# Patient Record
Sex: MALE
[De-identification: names, ages, dates, MRNs, and addresses within clinical notes are randomized per-mention and may not be internally consistent; named-entity substitution may affect disease eponyms.]

---

## 2019-05-24 ENCOUNTER — NON-APPOINTMENT (OUTPATIENT)
Age: 54
End: 2019-05-24

## 2019-05-24 ENCOUNTER — APPOINTMENT (OUTPATIENT)
Dept: INTERNAL MEDICINE | Facility: CLINIC | Age: 54
End: 2019-05-24
Payer: COMMERCIAL

## 2019-05-24 VITALS
HEIGHT: 67.5 IN | TEMPERATURE: 97.6 F | WEIGHT: 147 LBS | SYSTOLIC BLOOD PRESSURE: 122 MMHG | RESPIRATION RATE: 14 BRPM | HEART RATE: 63 BPM | DIASTOLIC BLOOD PRESSURE: 80 MMHG | OXYGEN SATURATION: 99 % | BODY MASS INDEX: 22.8 KG/M2

## 2019-05-24 DIAGNOSIS — Z87.19 PERSONAL HISTORY OF OTHER DISEASES OF THE DIGESTIVE SYSTEM: ICD-10-CM

## 2019-05-24 DIAGNOSIS — Z83.3 FAMILY HISTORY OF DIABETES MELLITUS: ICD-10-CM

## 2019-05-24 DIAGNOSIS — Z87.891 PERSONAL HISTORY OF NICOTINE DEPENDENCE: ICD-10-CM

## 2019-05-24 DIAGNOSIS — Z86.59 PERSONAL HISTORY OF OTHER MENTAL AND BEHAVIORAL DISORDERS: ICD-10-CM

## 2019-05-24 PROCEDURE — 36415 COLL VENOUS BLD VENIPUNCTURE: CPT

## 2019-05-24 PROCEDURE — 99386 PREV VISIT NEW AGE 40-64: CPT | Mod: 25

## 2019-05-24 PROCEDURE — 93000 ELECTROCARDIOGRAM COMPLETE: CPT

## 2019-05-24 RX ORDER — FEXOFENADINE HCL 60 MG
TABLET ORAL
Refills: 0 | Status: ACTIVE | COMMUNITY

## 2019-05-24 NOTE — HISTORY OF PRESENT ILLNESS
[FreeTextEntry1] : PHYSICAL\par NO COMPLAINTS [de-identified] : 53 YO MALE WITH HX OF GASTRITIS AND ANXIETY THAT RESOLVED, CAME TO THE OFFICE FOR PHYSICAL

## 2019-05-24 NOTE — HEALTH RISK ASSESSMENT
[Very Good] : ~his/her~  mood as very good [No falls in past year] : Patient reported no falls in the past year [0] : 2) Feeling down, depressed, or hopeless: Not at all (0) [] : No [de-identified] : REGULAR

## 2019-05-24 NOTE — PHYSICAL EXAM
[No Acute Distress] : no acute distress [Well Developed] : well developed [Well Nourished] : well nourished [Normal Sclera/Conjunctiva] : normal sclera/conjunctiva [Well-Appearing] : well-appearing [PERRL] : pupils equal round and reactive to light [Normal Outer Ear/Nose] : the outer ears and nose were normal in appearance [EOMI] : extraocular movements intact [Normal Oropharynx] : the oropharynx was normal [No JVD] : no jugular venous distention [Supple] : supple [No Lymphadenopathy] : no lymphadenopathy [Thyroid Normal, No Nodules] : the thyroid was normal and there were no nodules present [No Respiratory Distress] : no respiratory distress  [No Accessory Muscle Use] : no accessory muscle use [Clear to Auscultation] : lungs were clear to auscultation bilaterally [Normal Rate] : normal rate  [Normal S1, S2] : normal S1 and S2 [Regular Rhythm] : with a regular rhythm [No Carotid Bruits] : no carotid bruits [No Murmur] : no murmur heard [No Varicosities] : no varicosities [No Abdominal Bruit] : a ~M bruit was not heard ~T in the abdomen [Pedal Pulses Present] : the pedal pulses are present [No Edema] : there was no peripheral edema [No Extremity Clubbing/Cyanosis] : no extremity clubbing/cyanosis [Soft] : abdomen soft [No Palpable Aorta] : no palpable aorta [Non Tender] : non-tender [Non-distended] : non-distended [No Masses] : no abdominal mass palpated [Normal Bowel Sounds] : normal bowel sounds [No HSM] : no HSM [Normal Posterior Cervical Nodes] : no posterior cervical lymphadenopathy [Normal Anterior Cervical Nodes] : no anterior cervical lymphadenopathy [No CVA Tenderness] : no CVA  tenderness [No Joint Swelling] : no joint swelling [No Spinal Tenderness] : no spinal tenderness [Grossly Normal Strength/Tone] : grossly normal strength/tone [Normal Gait] : normal gait [Coordination Grossly Intact] : coordination grossly intact [No Rash] : no rash [No Focal Deficits] : no focal deficits [Deep Tendon Reflexes (DTR)] : deep tendon reflexes were 2+ and symmetric [Normal Affect] : the affect was normal [Normal Insight/Judgement] : insight and judgment were intact [FreeTextEntry1] : DEFERRED

## 2019-05-27 LAB
ALBUMIN SERPL ELPH-MCNC: 4.7 G/DL
ALP BLD-CCNC: 70 U/L
ALT SERPL-CCNC: 21 U/L
ANION GAP SERPL CALC-SCNC: 11 MMOL/L
APPEARANCE: CLEAR
AST SERPL-CCNC: 18 U/L
BACTERIA: NEGATIVE
BASOPHILS # BLD AUTO: 0.04 K/UL
BASOPHILS NFR BLD AUTO: 0.7 %
BILIRUB SERPL-MCNC: 0.8 MG/DL
BILIRUBIN URINE: NEGATIVE
BLOOD URINE: NEGATIVE
BUN SERPL-MCNC: 12 MG/DL
CALCIUM SERPL-MCNC: 9.7 MG/DL
CHLORIDE SERPL-SCNC: 102 MMOL/L
CHOLEST SERPL-MCNC: 240 MG/DL
CHOLEST/HDLC SERPL: 4.3 RATIO
CO2 SERPL-SCNC: 28 MMOL/L
COLOR: COLORLESS
CREAT SERPL-MCNC: 0.97 MG/DL
EOSINOPHIL # BLD AUTO: 0.14 K/UL
EOSINOPHIL NFR BLD AUTO: 2.3 %
GLUCOSE QUALITATIVE U: NEGATIVE
GLUCOSE SERPL-MCNC: 100 MG/DL
HCT VFR BLD CALC: 48.2 %
HDLC SERPL-MCNC: 56 MG/DL
HGB BLD-MCNC: 14.5 G/DL
HYALINE CASTS: 0 /LPF
IMM GRANULOCYTES NFR BLD AUTO: 0.2 %
KETONES URINE: NEGATIVE
LDLC SERPL CALC-MCNC: 145 MG/DL
LEUKOCYTE ESTERASE URINE: NEGATIVE
LYMPHOCYTES # BLD AUTO: 2.34 K/UL
LYMPHOCYTES NFR BLD AUTO: 38 %
MAN DIFF?: NORMAL
MCHC RBC-ENTMCNC: 27.3 PG
MCHC RBC-ENTMCNC: 30.1 GM/DL
MCV RBC AUTO: 90.6 FL
MICROSCOPIC-UA: NORMAL
MONOCYTES # BLD AUTO: 0.53 K/UL
MONOCYTES NFR BLD AUTO: 8.6 %
NEUTROPHILS # BLD AUTO: 3.09 K/UL
NEUTROPHILS NFR BLD AUTO: 50.2 %
NITRITE URINE: NEGATIVE
PH URINE: 6.5
PLATELET # BLD AUTO: 247 K/UL
POTASSIUM SERPL-SCNC: 4.9 MMOL/L
PROT SERPL-MCNC: 7.3 G/DL
PROTEIN URINE: NEGATIVE
PSA SERPL-MCNC: 2.32 NG/ML
RBC # BLD: 5.32 M/UL
RBC # FLD: 13.1 %
RED BLOOD CELLS URINE: 0 /HPF
SODIUM SERPL-SCNC: 141 MMOL/L
SPECIFIC GRAVITY URINE: 1
SQUAMOUS EPITHELIAL CELLS: 0 /HPF
TRIGL SERPL-MCNC: 193 MG/DL
UROBILINOGEN URINE: NORMAL
WBC # FLD AUTO: 6.15 K/UL
WHITE BLOOD CELLS URINE: 0 /HPF

## 2019-05-28 ENCOUNTER — OTHER (OUTPATIENT)
Age: 54
End: 2019-05-28

## 2019-06-25 RX ORDER — ATORVASTATIN CALCIUM 10 MG/1
10 TABLET, FILM COATED ORAL
Qty: 90 | Refills: 0 | Status: DISCONTINUED | COMMUNITY
Start: 2019-05-28 | End: 2019-06-25

## 2019-10-14 ENCOUNTER — APPOINTMENT (OUTPATIENT)
Dept: INTERNAL MEDICINE | Facility: CLINIC | Age: 54
End: 2019-10-14

## 2020-06-08 ENCOUNTER — APPOINTMENT (OUTPATIENT)
Dept: INTERNAL MEDICINE | Facility: CLINIC | Age: 55
End: 2020-06-08
Payer: COMMERCIAL

## 2020-06-08 ENCOUNTER — NON-APPOINTMENT (OUTPATIENT)
Age: 55
End: 2020-06-08

## 2020-06-08 VITALS
SYSTOLIC BLOOD PRESSURE: 137 MMHG | HEART RATE: 66 BPM | DIASTOLIC BLOOD PRESSURE: 80 MMHG | WEIGHT: 149 LBS | RESPIRATION RATE: 15 BRPM | TEMPERATURE: 98.8 F | OXYGEN SATURATION: 99 % | HEIGHT: 67.5 IN | BODY MASS INDEX: 23.11 KG/M2

## 2020-06-08 PROCEDURE — 93000 ELECTROCARDIOGRAM COMPLETE: CPT

## 2020-06-08 PROCEDURE — 36415 COLL VENOUS BLD VENIPUNCTURE: CPT

## 2020-06-08 PROCEDURE — 99214 OFFICE O/P EST MOD 30 MIN: CPT | Mod: 25

## 2020-06-08 NOTE — PLAN
[FreeTextEntry1] : Blood tests sent to the lab \par \par EKG\par \par Screening for COVID-19 antibodies

## 2020-06-08 NOTE — HEALTH RISK ASSESSMENT
[Good] : ~his/her~  mood as  good [Never (0 pts)] : Never (0 points) [No] : In the past 12 months have you used drugs other than those required for medical reasons? No [No falls in past year] : Patient reported no falls in the past year [0] : 2) Feeling down, depressed, or hopeless: Not at all (0) [] : No [Audit-CScore] : 0 [de-identified] : lightly active [de-identified] : regular diet [[id number]] : 0

## 2020-06-08 NOTE — PHYSICAL EXAM
[No Acute Distress] : no acute distress [Well Nourished] : well nourished [Well Developed] : well developed [Well-Appearing] : well-appearing [Normal Sclera/Conjunctiva] : normal sclera/conjunctiva [PERRL] : pupils equal round and reactive to light [EOMI] : extraocular movements intact [Normal Outer Ear/Nose] : the outer ears and nose were normal in appearance [Normal Oropharynx] : the oropharynx was normal [No JVD] : no jugular venous distention [No Lymphadenopathy] : no lymphadenopathy [Supple] : supple [Thyroid Normal, No Nodules] : the thyroid was normal and there were no nodules present [No Respiratory Distress] : no respiratory distress  [No Accessory Muscle Use] : no accessory muscle use [Clear to Auscultation] : lungs were clear to auscultation bilaterally [Normal Rate] : normal rate  [Regular Rhythm] : with a regular rhythm [Normal S1, S2] : normal S1 and S2 [No Murmur] : no murmur heard [No Carotid Bruits] : no carotid bruits [No Abdominal Bruit] : a ~M bruit was not heard ~T in the abdomen [No Varicosities] : no varicosities [Pedal Pulses Present] : the pedal pulses are present [No Edema] : there was no peripheral edema [No Palpable Aorta] : no palpable aorta [No Extremity Clubbing/Cyanosis] : no extremity clubbing/cyanosis [Soft] : abdomen soft [Non Tender] : non-tender [Non-distended] : non-distended [No Masses] : no abdominal mass palpated [No HSM] : no HSM [Normal Bowel Sounds] : normal bowel sounds [Normal Posterior Cervical Nodes] : no posterior cervical lymphadenopathy [Normal Anterior Cervical Nodes] : no anterior cervical lymphadenopathy [No CVA Tenderness] : no CVA  tenderness [No Spinal Tenderness] : no spinal tenderness [No Joint Swelling] : no joint swelling [Grossly Normal Strength/Tone] : grossly normal strength/tone [No Rash] : no rash [No Focal Deficits] : no focal deficits [Coordination Grossly Intact] : coordination grossly intact [Normal Gait] : normal gait [Deep Tendon Reflexes (DTR)] : deep tendon reflexes were 2+ and symmetric [Normal Affect] : the affect was normal [Normal Insight/Judgement] : insight and judgment were intact [Normal Sphincter Tone] : normal sphincter tone [No Mass] : no mass [Stool Occult Blood] : stool negative for occult blood [FreeTextEntry1] : mildly enlarged prostate

## 2020-06-08 NOTE — HISTORY OF PRESENT ILLNESS
[FreeTextEntry1] : Physical examination  [de-identified] : RAMA FRANCOIS is a 55 year old male patient with a PMHx of HLD and BPH who presents today for physical examination. He also requests COVID-19 antibody screening. He is feeling well today, but relates pain in both of his legs at times.

## 2020-06-08 NOTE — END OF VISIT
[FreeTextEntry3] : I, Ema Arshad, personally transcribed these services in the presence of Dr. Denver Vee MD. I, Dr. Denver Vee MD, personally performed the services described in this documentation as scribed by Ema Arshad in my presence and it is both accurate and complete.

## 2020-08-03 ENCOUNTER — RX RENEWAL (OUTPATIENT)
Age: 55
End: 2020-08-03

## 2021-06-17 ENCOUNTER — NON-APPOINTMENT (OUTPATIENT)
Age: 56
End: 2021-06-17

## 2021-06-17 ENCOUNTER — APPOINTMENT (OUTPATIENT)
Dept: INTERNAL MEDICINE | Facility: CLINIC | Age: 56
End: 2021-06-17
Payer: COMMERCIAL

## 2021-06-17 VITALS
HEART RATE: 66 BPM | BODY MASS INDEX: 23.58 KG/M2 | TEMPERATURE: 97.7 F | SYSTOLIC BLOOD PRESSURE: 135 MMHG | OXYGEN SATURATION: 99 % | WEIGHT: 152 LBS | HEIGHT: 67.5 IN | DIASTOLIC BLOOD PRESSURE: 81 MMHG | RESPIRATION RATE: 14 BRPM

## 2021-06-17 DIAGNOSIS — R73.09 OTHER ABNORMAL GLUCOSE: ICD-10-CM

## 2021-06-17 PROCEDURE — 99072 ADDL SUPL MATRL&STAF TM PHE: CPT

## 2021-06-17 PROCEDURE — 93000 ELECTROCARDIOGRAM COMPLETE: CPT

## 2021-06-17 PROCEDURE — 99396 PREV VISIT EST AGE 40-64: CPT | Mod: 25

## 2021-06-17 PROCEDURE — 36415 COLL VENOUS BLD VENIPUNCTURE: CPT

## 2021-06-20 NOTE — HISTORY OF PRESENT ILLNESS
[FreeTextEntry1] : Physical examination  [de-identified] : RAMA FRANCOIS is a 56 year old male with a PMHx of B12 deficiency, BPH, HLD, and anxiety who presents today for physical examination. He relates lower abdominal discomfort and the need to urinate throughout the night.\par \par He also relates lower back pain on and off.\par \par Pt has completed COVID-19 vaccine series ([employer]) on May 3, 2021.

## 2021-06-20 NOTE — END OF VISIT
[FreeTextEntry3] : I, Yue Baum, personally transcribed these services in the presence of Dr. Denver Vee MD. I, Dr. Denver Vee MD, personally performed the services described in this documentation as scribed by Yue Baum in my presence and it is both accurate and complete.

## 2021-06-20 NOTE — PHYSICAL EXAM
[No Acute Distress] : no acute distress [Well Nourished] : well nourished [Well Developed] : well developed [Well-Appearing] : well-appearing [Normal Sclera/Conjunctiva] : normal sclera/conjunctiva [PERRL] : pupils equal round and reactive to light [EOMI] : extraocular movements intact [Normal Outer Ear/Nose] : the outer ears and nose were normal in appearance [Normal Oropharynx] : the oropharynx was normal [No JVD] : no jugular venous distention [No Lymphadenopathy] : no lymphadenopathy [Supple] : supple [Thyroid Normal, No Nodules] : the thyroid was normal and there were no nodules present [No Respiratory Distress] : no respiratory distress  [No Accessory Muscle Use] : no accessory muscle use [Clear to Auscultation] : lungs were clear to auscultation bilaterally [Normal Rate] : normal rate  [Regular Rhythm] : with a regular rhythm [Normal S1, S2] : normal S1 and S2 [No Murmur] : no murmur heard [No Carotid Bruits] : no carotid bruits [No Abdominal Bruit] : a ~M bruit was not heard ~T in the abdomen [No Varicosities] : no varicosities [Pedal Pulses Present] : the pedal pulses are present [No Edema] : there was no peripheral edema [No Palpable Aorta] : no palpable aorta [No Extremity Clubbing/Cyanosis] : no extremity clubbing/cyanosis [Soft] : abdomen soft [Non Tender] : non-tender [Non-distended] : non-distended [No Masses] : no abdominal mass palpated [No HSM] : no HSM [Normal Bowel Sounds] : normal bowel sounds [Normal Sphincter Tone] : normal sphincter tone [No Mass] : no mass [Normal Posterior Cervical Nodes] : no posterior cervical lymphadenopathy [Normal Anterior Cervical Nodes] : no anterior cervical lymphadenopathy [No CVA Tenderness] : no CVA  tenderness [No Spinal Tenderness] : no spinal tenderness [No Joint Swelling] : no joint swelling [Grossly Normal Strength/Tone] : grossly normal strength/tone [No Rash] : no rash [Coordination Grossly Intact] : coordination grossly intact [No Focal Deficits] : no focal deficits [Normal Gait] : normal gait [Deep Tendon Reflexes (DTR)] : deep tendon reflexes were 2+ and symmetric [Normal Affect] : the affect was normal [Normal Insight/Judgement] : insight and judgment were intact [Stool Occult Blood] : stool negative for occult blood [FreeTextEntry1] : guaiac negative ; mildly enlarged prostate

## 2021-06-20 NOTE — PLAN
[FreeTextEntry1] : Blood tests and urine sent to the lab \par \par EKG\par \par Abdomen and Pelvic US\par

## 2021-06-20 NOTE — REVIEW OF SYSTEMS
[Negative] : Heme/Lymph [FreeTextEntry7] : pain bilateral groin [FreeTextEntry8] : frequent urination at night [FreeTextEntry9] : lower back pain

## 2021-06-20 NOTE — HEALTH RISK ASSESSMENT
[Good] : ~his/her~  mood as  good [Never (0 pts)] : Never (0 points) [No] : In the past 12 months have you used drugs other than those required for medical reasons? No [No falls in past year] : Patient reported no falls in the past year [0] : 2) Feeling down, depressed, or hopeless: Not at all (0) [] : No [Audit-CScore] : 0 [de-identified] : regular diet [de-identified] : lightly active [[id number]] : 0 [ColonoscopyDate] : 01/2020

## 2021-07-23 ENCOUNTER — APPOINTMENT (OUTPATIENT)
Dept: INTERNAL MEDICINE | Facility: CLINIC | Age: 56
End: 2021-07-23
Payer: COMMERCIAL

## 2021-07-23 VITALS
OXYGEN SATURATION: 99 % | TEMPERATURE: 99.3 F | RESPIRATION RATE: 15 BRPM | SYSTOLIC BLOOD PRESSURE: 129 MMHG | HEART RATE: 70 BPM | DIASTOLIC BLOOD PRESSURE: 81 MMHG | HEIGHT: 67.5 IN

## 2021-07-23 DIAGNOSIS — M54.16 RADICULOPATHY, LUMBAR REGION: ICD-10-CM

## 2021-07-23 PROCEDURE — 99072 ADDL SUPL MATRL&STAF TM PHE: CPT

## 2021-07-23 PROCEDURE — 99213 OFFICE O/P EST LOW 20 MIN: CPT

## 2021-07-25 NOTE — PHYSICAL EXAM
[No Acute Distress] : no acute distress [Well Nourished] : well nourished [Well Developed] : well developed [Well-Appearing] : well-appearing [Normal Sclera/Conjunctiva] : normal sclera/conjunctiva [PERRL] : pupils equal round and reactive to light [EOMI] : extraocular movements intact [Normal Outer Ear/Nose] : the outer ears and nose were normal in appearance [Normal Oropharynx] : the oropharynx was normal [No JVD] : no jugular venous distention [No Lymphadenopathy] : no lymphadenopathy [Supple] : supple [Thyroid Normal, No Nodules] : the thyroid was normal and there were no nodules present [No Respiratory Distress] : no respiratory distress  [No Accessory Muscle Use] : no accessory muscle use [Clear to Auscultation] : lungs were clear to auscultation bilaterally [Normal Rate] : normal rate  [Regular Rhythm] : with a regular rhythm [Normal S1, S2] : normal S1 and S2 [No Murmur] : no murmur heard [No Carotid Bruits] : no carotid bruits [No Abdominal Bruit] : a ~M bruit was not heard ~T in the abdomen [No Varicosities] : no varicosities [Pedal Pulses Present] : the pedal pulses are present [No Edema] : there was no peripheral edema [No Palpable Aorta] : no palpable aorta [No Extremity Clubbing/Cyanosis] : no extremity clubbing/cyanosis [Soft] : abdomen soft [Non Tender] : non-tender [Non-distended] : non-distended [No Masses] : no abdominal mass palpated [No HSM] : no HSM [Normal Bowel Sounds] : normal bowel sounds [Normal Posterior Cervical Nodes] : no posterior cervical lymphadenopathy [Normal Anterior Cervical Nodes] : no anterior cervical lymphadenopathy [No CVA Tenderness] : no CVA  tenderness [No Spinal Tenderness] : no spinal tenderness [No Joint Swelling] : no joint swelling [Grossly Normal Strength/Tone] : grossly normal strength/tone [No Rash] : no rash [Coordination Grossly Intact] : coordination grossly intact [No Focal Deficits] : no focal deficits [Normal Gait] : normal gait [Deep Tendon Reflexes (DTR)] : deep tendon reflexes were 2+ and symmetric [Normal Affect] : the affect was normal [Normal Insight/Judgement] : insight and judgment were intact [de-identified] : deferred

## 2021-07-25 NOTE — HISTORY OF PRESENT ILLNESS
[Spouse] : spouse [FreeTextEntry8] : RAMA FRANCOIS is a 56 year old male patient with a PMHx of B12 deficiency, HLD, anxiety, and BPH who presents today complaining of lower back pain, mostly on the left side, on and off. Four days ago, the back pain was severe. Patient took Tylenol and applied a heating pad, which helped.\par \par Patient had abdominal and pelvic US which was wnl; pancreatic tail was partially obscured by overlying abdominal bowel gas.

## 2021-07-25 NOTE — HEALTH RISK ASSESSMENT
[Never (0 pts)] : Never (0 points) [No] : In the past 12 months have you used drugs other than those required for medical reasons? No [No falls in past year] : Patient reported no falls in the past year [0] : 2) Feeling down, depressed, or hopeless: Not at all (0) [] : No [Audit-CScore] : 0 [de-identified] : lightly active [de-identified] : regular diet [[id number]] : 0

## 2021-07-26 ENCOUNTER — RX RENEWAL (OUTPATIENT)
Age: 56
End: 2021-07-26

## 2021-09-17 ENCOUNTER — RX RENEWAL (OUTPATIENT)
Age: 56
End: 2021-09-17

## 2022-01-01 NOTE — REVIEW OF SYSTEMS
Yaima Heck  (RN)  2022 21:13:27 [Back Pain] : back pain [Negative] : Heme/Lymph [FreeTextEntry9] : lower back, mostly left side, pain

## 2022-03-04 ENCOUNTER — APPOINTMENT (OUTPATIENT)
Dept: INTERNAL MEDICINE | Facility: CLINIC | Age: 57
End: 2022-03-04
Payer: COMMERCIAL

## 2022-03-04 VITALS
OXYGEN SATURATION: 98 % | BODY MASS INDEX: 24.2 KG/M2 | RESPIRATION RATE: 14 BRPM | WEIGHT: 156 LBS | HEIGHT: 67.5 IN | DIASTOLIC BLOOD PRESSURE: 100 MMHG | HEART RATE: 87 BPM | SYSTOLIC BLOOD PRESSURE: 148 MMHG | TEMPERATURE: 97.5 F

## 2022-03-04 VITALS — SYSTOLIC BLOOD PRESSURE: 140 MMHG | DIASTOLIC BLOOD PRESSURE: 90 MMHG

## 2022-03-04 PROCEDURE — 99214 OFFICE O/P EST MOD 30 MIN: CPT

## 2022-03-05 NOTE — HEALTH RISK ASSESSMENT
[Former] : Former [Never (0 pts)] : Never (0 points) [No] : In the past 12 months have you used drugs other than those required for medical reasons? No [No falls in past year] : Patient reported no falls in the past year [0] : 2) Feeling down, depressed, or hopeless: Not at all (0) [Audit-CScore] : 0 [de-identified] : lightly active [de-identified] : regular diet [[id number]] : 0

## 2022-03-05 NOTE — END OF VISIT
[FreeTextEntry3] : I, Leyda Sánchez, personally transcribed these services in the presence of Dr. Denver Vee MD. I, Dr. Denver Vee MD, personally performed the services described in this documentation as scribed by Leyda Sánchez in my presence and it is both accurate and complete.

## 2022-03-05 NOTE — PLAN
[FreeTextEntry1] : Amlodipine 2.5 mg PO QD\par \par Fasting blood test; pt is to have it done at [facility] \par \par X-ray of sinuses\par \par Skelaxin  5 mg PO qhs prn pain \par \par Flonase 2 puffs to each nostrils

## 2022-03-05 NOTE — HISTORY OF PRESENT ILLNESS
[FreeTextEntry8] : RAMA FRANCOIS is a 56 year old male with a PMHx of anxiety, gastritis, B12 deficiency, BPH, HLD, and abnormal glucose who presents today complaining of headaches, runny nose, pain of the sinuses for the past several months on and off.\par \par Pt also states that his BP is elevated for the last three days. \par \par Pt also relates pain of left groin for the past two weeks.

## 2022-03-05 NOTE — REVIEW OF SYSTEMS
[Headache] : headache [Negative] : Heme/Lymph [FreeTextEntry4] : runny nose; pain of the sinuses [FreeTextEntry8] : pain of the left groin

## 2022-04-15 ENCOUNTER — RX RENEWAL (OUTPATIENT)
Age: 57
End: 2022-04-15

## 2022-06-24 ENCOUNTER — RX RENEWAL (OUTPATIENT)
Age: 57
End: 2022-06-24

## 2022-07-14 ENCOUNTER — RX RENEWAL (OUTPATIENT)
Age: 57
End: 2022-07-14

## 2022-09-27 ENCOUNTER — APPOINTMENT (OUTPATIENT)
Dept: INTERNAL MEDICINE | Facility: CLINIC | Age: 57
End: 2022-09-27

## 2022-09-27 ENCOUNTER — NON-APPOINTMENT (OUTPATIENT)
Age: 57
End: 2022-09-27

## 2022-09-27 VITALS
OXYGEN SATURATION: 100 % | WEIGHT: 154 LBS | RESPIRATION RATE: 15 BRPM | TEMPERATURE: 98 F | BODY MASS INDEX: 23.89 KG/M2 | HEART RATE: 77 BPM | SYSTOLIC BLOOD PRESSURE: 137 MMHG | DIASTOLIC BLOOD PRESSURE: 87 MMHG | HEIGHT: 67.5 IN

## 2022-09-27 DIAGNOSIS — M79.18 MYALGIA, OTHER SITE: ICD-10-CM

## 2022-09-27 DIAGNOSIS — N40.0 BENIGN PROSTATIC HYPERPLASIA WITHOUT LOWER URINARY TRACT SYMPMS: ICD-10-CM

## 2022-09-27 PROCEDURE — 93000 ELECTROCARDIOGRAM COMPLETE: CPT

## 2022-09-27 PROCEDURE — G0008: CPT

## 2022-09-27 PROCEDURE — 99396 PREV VISIT EST AGE 40-64: CPT | Mod: 25

## 2022-09-27 PROCEDURE — 36415 COLL VENOUS BLD VENIPUNCTURE: CPT

## 2022-09-27 PROCEDURE — 90686 IIV4 VACC NO PRSV 0.5 ML IM: CPT

## 2022-10-02 ENCOUNTER — NON-APPOINTMENT (OUTPATIENT)
Age: 57
End: 2022-10-02

## 2022-10-02 NOTE — PHYSICAL EXAM
[No Acute Distress] : no acute distress [Well Nourished] : well nourished [Well Developed] : well developed [Well-Appearing] : well-appearing [Normal Sclera/Conjunctiva] : normal sclera/conjunctiva [PERRL] : pupils equal round and reactive to light [EOMI] : extraocular movements intact [Normal Outer Ear/Nose] : the outer ears and nose were normal in appearance [Normal Oropharynx] : the oropharynx was normal [No JVD] : no jugular venous distention [No Lymphadenopathy] : no lymphadenopathy [Supple] : supple [Thyroid Normal, No Nodules] : the thyroid was normal and there were no nodules present [No Respiratory Distress] : no respiratory distress  [No Accessory Muscle Use] : no accessory muscle use [Clear to Auscultation] : lungs were clear to auscultation bilaterally [Normal Rate] : normal rate  [Regular Rhythm] : with a regular rhythm [Normal S1, S2] : normal S1 and S2 [No Murmur] : no murmur heard [No Carotid Bruits] : no carotid bruits [No Abdominal Bruit] : a ~M bruit was not heard ~T in the abdomen [No Varicosities] : no varicosities [Pedal Pulses Present] : the pedal pulses are present [No Edema] : there was no peripheral edema [No Palpable Aorta] : no palpable aorta [No Extremity Clubbing/Cyanosis] : no extremity clubbing/cyanosis [Soft] : abdomen soft [Non Tender] : non-tender [Non-distended] : non-distended [No Masses] : no abdominal mass palpated [No HSM] : no HSM [Normal Bowel Sounds] : normal bowel sounds [Normal Posterior Cervical Nodes] : no posterior cervical lymphadenopathy [Normal Anterior Cervical Nodes] : no anterior cervical lymphadenopathy [No CVA Tenderness] : no CVA  tenderness [No Spinal Tenderness] : no spinal tenderness [No Joint Swelling] : no joint swelling [Grossly Normal Strength/Tone] : grossly normal strength/tone [No Rash] : no rash [Coordination Grossly Intact] : coordination grossly intact [No Focal Deficits] : no focal deficits [Normal Gait] : normal gait [Deep Tendon Reflexes (DTR)] : deep tendon reflexes were 2+ and symmetric [Normal Affect] : the affect was normal [Normal Insight/Judgement] : insight and judgment were intact [Normal Sphincter Tone] : normal sphincter tone [No Mass] : no mass [Stool Occult Blood] : stool negative for occult blood [FreeTextEntry1] : guaiac negative

## 2022-10-02 NOTE — PLAN
[FreeTextEntry1] : Blood tests and urine sent to the lab\par \par EKG\par \par Cardiology evaluation \par \par Echocardiogram \par \par Carotid Doppler\par \par H Influenza vaccine \par \par Meloxicam 15 mg Po QD

## 2022-10-02 NOTE — HEALTH RISK ASSESSMENT
[Good] : ~his/her~  mood as  good [Former] : Former [No] : In the past 12 months have you used drugs other than those required for medical reasons? No [No falls in past year] : Patient reported no falls in the past year [0] : 2) Feeling down, depressed, or hopeless: Not at all (0) [Audit-CScore] : 0 [de-identified] : lightly active [de-identified] : regular diet [[id number]] : 0

## 2022-10-02 NOTE — HISTORY OF PRESENT ILLNESS
[FreeTextEntry1] : physical examination  [de-identified] : RAMA FRANCOIS is a 57 year old male with a PMHx of B12 deficiency, BPH, HTN, HLD, and abnormal glucose who presents today for physical examination. He is feeling okay however he relates pains of the legs and arms for the last few weeks. \par \par Pt also relates dizziness at times and the felling that he is going to faint. He states this happens mostly at work while walking.

## 2022-10-02 NOTE — REVIEW OF SYSTEMS
[Dizziness] : dizziness [Negative] : Heme/Lymph [FreeTextEntry9] : pain arms and legs  [de-identified] : feeling faint

## 2022-10-02 NOTE — END OF VISIT
[FreeTextEntry3] : I, Yue Baum, personally transcribed these services in the presence of Dr. Denver Vee MD.\par I, Dr. Denver Vee MD, personally performed the services described in this documentation as scribed by Yue Baum in my presence and it is both accurate and complete.

## 2022-10-17 ENCOUNTER — APPOINTMENT (OUTPATIENT)
Dept: HEART AND VASCULAR | Facility: CLINIC | Age: 57
End: 2022-10-17

## 2022-10-17 PROCEDURE — 93306 TTE W/DOPPLER COMPLETE: CPT

## 2022-10-24 ENCOUNTER — RX RENEWAL (OUTPATIENT)
Age: 57
End: 2022-10-24

## 2023-04-12 ENCOUNTER — APPOINTMENT (OUTPATIENT)
Dept: INTERNAL MEDICINE | Facility: CLINIC | Age: 58
End: 2023-04-12
Payer: COMMERCIAL

## 2023-04-12 VITALS
HEART RATE: 65 BPM | BODY MASS INDEX: 24.82 KG/M2 | SYSTOLIC BLOOD PRESSURE: 141 MMHG | OXYGEN SATURATION: 100 % | TEMPERATURE: 97.7 F | RESPIRATION RATE: 14 BRPM | DIASTOLIC BLOOD PRESSURE: 85 MMHG | HEIGHT: 67.5 IN | WEIGHT: 160 LBS

## 2023-04-12 DIAGNOSIS — M79.603 PAIN IN ARM, UNSPECIFIED: ICD-10-CM

## 2023-04-12 DIAGNOSIS — M54.16 RADICULOPATHY, LUMBAR REGION: ICD-10-CM

## 2023-04-12 PROCEDURE — 99214 OFFICE O/P EST MOD 30 MIN: CPT | Mod: 25

## 2023-04-12 PROCEDURE — 36415 COLL VENOUS BLD VENIPUNCTURE: CPT

## 2023-04-16 NOTE — HEALTH RISK ASSESSMENT
[No] : In the past 12 months have you used drugs other than those required for medical reasons? No [No falls in past year] : Patient reported no falls in the past year [0] : 2) Feeling down, depressed, or hopeless: Not at all (0) [Former] : Former [Audit-CScore] : 0 [de-identified] : lightly active [de-identified] : regular diet [[id number]] : 0

## 2023-04-16 NOTE — END OF VISIT
[FreeTextEntry3] :  I, Yolanda Mast, personally transcribed these services in the presence of Dr. Denver Vee MD.\par I, Dr. Denver Vee MD, personally performed the services described in this documentation as scribed by Yolanda Mast in my presence and it is both accurate and complete.

## 2023-04-16 NOTE — REVIEW OF SYSTEMS
[Nocturia] : nocturia [Negative] : Heme/Lymph [FreeTextEntry9] : pain of lower back, legs and arms, stiffness of hands

## 2023-04-16 NOTE — HISTORY OF PRESENT ILLNESS
[FreeTextEntry1] : follow-up  [de-identified] : RAMA FRANCOIS is a 57 year old male with a PMHx of B12 deficiency, BPH, HTN, HLD and abnormal glucose who presents today for follow-up. He relates back pain. He also c/o pain of the arms (from the elbows down to the hands) and legs(from knees down to the feet).\par \par He also relates stiffness of the hands at times\par \par He also relates nocturia.\par \par Pt also relates scarring on the right sided of his chest after scratching a small pruritic lesion he had. He also has a small left pruritic lesion on the left side of his chest this time.

## 2023-04-16 NOTE — PLAN
[FreeTextEntry1] : Blood tests and urine sent to the lab - Pt to go fasting to the lab\par \par MRI L-S spine\par \par  evaluation\par \par Dermatology evaluation  \par

## 2023-04-17 ENCOUNTER — TRANSCRIPTION ENCOUNTER (OUTPATIENT)
Age: 58
End: 2023-04-17

## 2023-06-09 ENCOUNTER — APPOINTMENT (OUTPATIENT)
Dept: UROLOGY | Facility: CLINIC | Age: 58
End: 2023-06-09
Payer: COMMERCIAL

## 2023-06-09 VITALS
HEART RATE: 78 BPM | WEIGHT: 159 LBS | DIASTOLIC BLOOD PRESSURE: 84 MMHG | HEIGHT: 67.5 IN | BODY MASS INDEX: 24.66 KG/M2 | RESPIRATION RATE: 14 BRPM | SYSTOLIC BLOOD PRESSURE: 137 MMHG | OXYGEN SATURATION: 97 %

## 2023-06-09 PROCEDURE — 99244 OFF/OP CNSLTJ NEW/EST MOD 40: CPT

## 2023-06-09 RX ORDER — TAMSULOSIN HYDROCHLORIDE 0.4 MG/1
0.4 CAPSULE ORAL
Qty: 30 | Refills: 6 | Status: ACTIVE | COMMUNITY
Start: 2023-06-09 | End: 1900-01-01

## 2023-06-09 RX ORDER — CYCLOBENZAPRINE HYDROCHLORIDE 5 MG/1
5 TABLET, FILM COATED ORAL
Qty: 30 | Refills: 0 | Status: COMPLETED | COMMUNITY
Start: 2022-03-04 | End: 2023-06-09

## 2023-06-11 NOTE — HISTORY OF PRESENT ILLNESS
[FreeTextEntry1] : 57 yo with bothersome LUTS\par the patient has notable nocturia and frequency\par he states that he does not snore much but his wife seems to disagree \par \par IPSS 6\par \par he is not on any medication\par he does have fluids at night\par and it is not clear if he is experiencing obstructive sleep apnea\par \par 4/2023\par UA - wnl\par HgbA1c - 5.7\par \par 10/2022\par PSA 1.89 ng/ml\par \par 7/2021\par PVR 22 cc\par 15 gram prostate \par normal kidneys

## 2023-06-11 NOTE — ASSESSMENT
[FreeTextEntry1] : 59 yo with urinary frequency and nocturia\par \par discussed the potential causes of nocturia\par discussed behavioral modification\par \par - flomax 0.4 mg po nightly - full R+B regarding the use of this drug \par - Pulmonary consult to assess for SE\par - renal and bladder US\par - f/u to review

## 2023-06-11 NOTE — PHYSICAL EXAM
[General Appearance - Well Developed] : well developed [General Appearance - Well Nourished] : well nourished [Normal Appearance] : normal appearance [Well Groomed] : well groomed [General Appearance - In No Acute Distress] : no acute distress [Edema] : no peripheral edema [Respiration, Rhythm And Depth] : normal respiratory rhythm and effort [Exaggerated Use Of Accessory Muscles For Inspiration] : no accessory muscle use [Abdomen Soft] : soft [Abdomen Tenderness] : non-tender [Costovertebral Angle Tenderness] : no ~M costovertebral angle tenderness [Normal Station and Gait] : the gait and station were normal for the patient's age [] : no rash [No Focal Deficits] : no focal deficits [Oriented To Time, Place, And Person] : oriented to person, place, and time [Mood] : the mood was normal [Affect] : the affect was normal [Not Anxious] : not anxious [No Palpable Adenopathy] : no palpable adenopathy

## 2023-06-27 ENCOUNTER — TRANSCRIPTION ENCOUNTER (OUTPATIENT)
Age: 58
End: 2023-06-27

## 2023-07-11 ENCOUNTER — TRANSCRIPTION ENCOUNTER (OUTPATIENT)
Age: 58
End: 2023-07-11

## 2023-10-06 ENCOUNTER — APPOINTMENT (OUTPATIENT)
Dept: FAMILY MEDICINE | Facility: CLINIC | Age: 58
End: 2023-10-06
Payer: COMMERCIAL

## 2023-10-06 ENCOUNTER — NON-APPOINTMENT (OUTPATIENT)
Age: 58
End: 2023-10-06

## 2023-10-06 VITALS
HEIGHT: 67.5 IN | WEIGHT: 159 LBS | OXYGEN SATURATION: 98 % | RESPIRATION RATE: 16 BRPM | DIASTOLIC BLOOD PRESSURE: 80 MMHG | BODY MASS INDEX: 24.66 KG/M2 | SYSTOLIC BLOOD PRESSURE: 131 MMHG | TEMPERATURE: 98.4 F | HEART RATE: 86 BPM

## 2023-10-06 DIAGNOSIS — M79.605 PAIN IN RIGHT LEG: ICD-10-CM

## 2023-10-06 DIAGNOSIS — R55 SYNCOPE AND COLLAPSE: ICD-10-CM

## 2023-10-06 DIAGNOSIS — Z13.6 ENCOUNTER FOR SCREENING FOR CARDIOVASCULAR DISORDERS: ICD-10-CM

## 2023-10-06 DIAGNOSIS — Z87.898 PERSONAL HISTORY OF OTHER SPECIFIED CONDITIONS: ICD-10-CM

## 2023-10-06 DIAGNOSIS — R10.31 RIGHT LOWER QUADRANT PAIN: ICD-10-CM

## 2023-10-06 DIAGNOSIS — E78.5 HYPERLIPIDEMIA, UNSPECIFIED: ICD-10-CM

## 2023-10-06 DIAGNOSIS — Z11.59 ENCOUNTER FOR SCREENING FOR OTHER VIRAL DISEASES: ICD-10-CM

## 2023-10-06 DIAGNOSIS — M79.604 PAIN IN RIGHT LEG: ICD-10-CM

## 2023-10-06 DIAGNOSIS — Z23 ENCOUNTER FOR IMMUNIZATION: ICD-10-CM

## 2023-10-06 DIAGNOSIS — M25.511 PAIN IN RIGHT SHOULDER: ICD-10-CM

## 2023-10-06 DIAGNOSIS — E53.8 DEFICIENCY OF OTHER SPECIFIED B GROUP VITAMINS: ICD-10-CM

## 2023-10-06 DIAGNOSIS — R10.32 RIGHT LOWER QUADRANT PAIN: ICD-10-CM

## 2023-10-06 DIAGNOSIS — Z00.00 ENCOUNTER FOR GENERAL ADULT MEDICAL EXAMINATION W/OUT ABNORMAL FINDINGS: ICD-10-CM

## 2023-10-06 DIAGNOSIS — Z87.09 PERSONAL HISTORY OF OTHER DISEASES OF THE RESPIRATORY SYSTEM: ICD-10-CM

## 2023-10-06 DIAGNOSIS — Z78.9 OTHER SPECIFIED HEALTH STATUS: ICD-10-CM

## 2023-10-06 PROCEDURE — 90686 IIV4 VACC NO PRSV 0.5 ML IM: CPT

## 2023-10-06 PROCEDURE — 99396 PREV VISIT EST AGE 40-64: CPT | Mod: 25

## 2023-10-06 PROCEDURE — 93000 ELECTROCARDIOGRAM COMPLETE: CPT | Mod: 59

## 2023-10-06 PROCEDURE — 90460 IM ADMIN 1ST/ONLY COMPONENT: CPT

## 2023-10-06 PROCEDURE — 36415 COLL VENOUS BLD VENIPUNCTURE: CPT

## 2023-10-06 RX ORDER — CYCLOBENZAPRINE HYDROCHLORIDE 5 MG/1
5 TABLET, FILM COATED ORAL
Qty: 14 | Refills: 0 | Status: ACTIVE | COMMUNITY
Start: 2023-10-06 | End: 1900-01-01

## 2023-10-08 PROBLEM — R55 NEAR SYNCOPE: Status: RESOLVED | Noted: 2022-09-27 | Resolved: 2023-10-08

## 2023-10-08 PROBLEM — M79.604 PAIN IN BOTH LOWER EXTREMITIES: Status: RESOLVED | Noted: 2023-04-12 | Resolved: 2023-10-08

## 2023-10-08 PROBLEM — E53.8 B12 DEFICIENCY: Status: ACTIVE | Noted: 2020-06-08

## 2023-10-08 PROBLEM — Z13.6 SCREENING FOR HEART DISEASE: Status: ACTIVE | Noted: 2023-10-06

## 2023-10-08 PROBLEM — Z87.898 HISTORY OF NOCTURIA: Status: RESOLVED | Noted: 2023-04-12 | Resolved: 2023-10-08

## 2023-11-05 ENCOUNTER — NON-APPOINTMENT (OUTPATIENT)
Age: 58
End: 2023-11-05

## 2024-01-09 ENCOUNTER — TRANSCRIPTION ENCOUNTER (OUTPATIENT)
Age: 59
End: 2024-01-09

## 2024-03-21 ENCOUNTER — APPOINTMENT (OUTPATIENT)
Dept: FAMILY MEDICINE | Facility: CLINIC | Age: 59
End: 2024-03-21
Payer: COMMERCIAL

## 2024-03-21 VITALS
OXYGEN SATURATION: 98 % | BODY MASS INDEX: 24.66 KG/M2 | DIASTOLIC BLOOD PRESSURE: 88 MMHG | HEIGHT: 67.5 IN | HEART RATE: 83 BPM | WEIGHT: 159 LBS | TEMPERATURE: 98 F | RESPIRATION RATE: 14 BRPM | SYSTOLIC BLOOD PRESSURE: 136 MMHG

## 2024-03-21 DIAGNOSIS — M79.10 MYALGIA, UNSPECIFIED SITE: ICD-10-CM

## 2024-03-21 DIAGNOSIS — I10 ESSENTIAL (PRIMARY) HYPERTENSION: ICD-10-CM

## 2024-03-21 DIAGNOSIS — M77.11 LATERAL EPICONDYLITIS, RIGHT ELBOW: ICD-10-CM

## 2024-03-21 DIAGNOSIS — K21.9 GASTRO-ESOPHAGEAL REFLUX DISEASE W/OUT ESOPHAGITIS: ICD-10-CM

## 2024-03-21 DIAGNOSIS — L91.0 HYPERTROPHIC SCAR: ICD-10-CM

## 2024-03-21 DIAGNOSIS — L29.9 PRURITUS, UNSPECIFIED: ICD-10-CM

## 2024-03-21 PROCEDURE — 99214 OFFICE O/P EST MOD 30 MIN: CPT

## 2024-03-21 PROCEDURE — 36415 COLL VENOUS BLD VENIPUNCTURE: CPT

## 2024-03-21 RX ORDER — PANTOPRAZOLE 20 MG/1
20 TABLET, DELAYED RELEASE ORAL
Qty: 30 | Refills: 2 | Status: ACTIVE | COMMUNITY
Start: 2024-03-21 | End: 1900-01-01

## 2024-03-21 RX ORDER — LIDOCAINE 5% 700 MG/1
5 PATCH TOPICAL
Qty: 1 | Refills: 1 | Status: ACTIVE | COMMUNITY
Start: 2024-03-21 | End: 1900-01-01

## 2024-03-21 RX ORDER — MELOXICAM 15 MG/1
15 TABLET ORAL
Qty: 30 | Refills: 3 | Status: COMPLETED | COMMUNITY
Start: 2022-09-27 | End: 2024-03-21

## 2024-03-21 RX ORDER — BETAMETHASONE DIPROPIONATE 0.5 MG/G
0.05 CREAM TOPICAL TWICE DAILY
Qty: 1 | Refills: 0 | Status: ACTIVE | COMMUNITY
Start: 2023-04-12 | End: 1900-01-01

## 2024-03-21 RX ORDER — MELOXICAM 15 MG/1
15 TABLET ORAL
Qty: 30 | Refills: 1 | Status: COMPLETED | COMMUNITY
Start: 2021-07-23 | End: 2024-03-21

## 2024-04-10 ENCOUNTER — TRANSCRIPTION ENCOUNTER (OUTPATIENT)
Age: 59
End: 2024-04-10

## 2024-04-10 ENCOUNTER — RX RENEWAL (OUTPATIENT)
Age: 59
End: 2024-04-10

## 2024-04-10 RX ORDER — ROSUVASTATIN CALCIUM 5 MG/1
5 TABLET, FILM COATED ORAL
Qty: 90 | Refills: 1 | Status: ACTIVE | COMMUNITY
Start: 2019-06-25 | End: 1900-01-01

## 2024-04-16 PROBLEM — M79.10 MYALGIA: Status: ACTIVE | Noted: 2024-03-21

## 2024-04-16 PROBLEM — I10 HTN (HYPERTENSION), BENIGN: Status: ACTIVE | Noted: 2022-03-04

## 2024-04-16 PROBLEM — L91.0 KELOID: Status: ACTIVE | Noted: 2023-04-12

## 2024-04-16 NOTE — HISTORY OF PRESENT ILLNESS
[FreeTextEntry1] : myalgia  [de-identified] : 57 y/o male presents with c/o body pain. She also c/o right elbow pain and skin itching at the site of keloid.  Pt reports taking omeprazole 20 mg QD as needed for heartburn.

## 2024-04-16 NOTE — PHYSICAL EXAM
[No Acute Distress] : no acute distress [Well Developed] : well developed [Well-Appearing] : well-appearing [Normal Sclera/Conjunctiva] : normal sclera/conjunctiva [EOMI] : extraocular movements intact [Supple] : supple [No Respiratory Distress] : no respiratory distress  [No Accessory Muscle Use] : no accessory muscle use [Clear to Auscultation] : lungs were clear to auscultation bilaterally [Normal Rate] : normal rate  [Regular Rhythm] : with a regular rhythm [Normal S1, S2] : normal S1 and S2 [No Edema] : there was no peripheral edema [Soft] : abdomen soft [Non Tender] : non-tender [Grossly Normal Strength/Tone] : grossly normal strength/tone [No Focal Deficits] : no focal deficits [Normal Affect] : the affect was normal [Normal Insight/Judgement] : insight and judgment were intact [de-identified] : keloid

## 2024-04-16 NOTE — PLAN
[FreeTextEntry1] :  #Myalgia -ESR, CRP, CCP, NENO, RF, CBC w. diff, CMP, CK, CBC w. diff, TSH w. rFT4, uric acid, UA + microscopy -Diclofenac BID -Lidocaine patch BID -Rheumatology referral  #Lateral epicondylitis of right elbow -Elbow brace   #GERD -GI referral -Pantoprazole 20 mg QD prn   #Itching -Betamethasone BID  Labs drawn in office

## 2024-05-08 ENCOUNTER — APPOINTMENT (OUTPATIENT)
Dept: RHEUMATOLOGY | Facility: CLINIC | Age: 59
End: 2024-05-08
Payer: COMMERCIAL

## 2024-05-08 VITALS
HEIGHT: 67.5 IN | SYSTOLIC BLOOD PRESSURE: 144 MMHG | RESPIRATION RATE: 15 BRPM | WEIGHT: 159 LBS | TEMPERATURE: 98.4 F | HEART RATE: 88 BPM | DIASTOLIC BLOOD PRESSURE: 86 MMHG | OXYGEN SATURATION: 99 % | BODY MASS INDEX: 24.66 KG/M2

## 2024-05-08 DIAGNOSIS — M77.10 LATERAL EPICONDYLITIS, UNSPECIFIED ELBOW: ICD-10-CM

## 2024-05-08 PROCEDURE — 99204 OFFICE O/P NEW MOD 45 MIN: CPT

## 2024-05-08 RX ORDER — DICLOFENAC SODIUM 1% 10 MG/G
1 GEL TOPICAL
Qty: 1 | Refills: 0 | Status: DISCONTINUED | COMMUNITY
Start: 2024-03-21 | End: 2024-05-08

## 2024-05-08 RX ORDER — MELOXICAM 15 MG/1
15 TABLET ORAL
Qty: 30 | Refills: 1 | Status: ACTIVE | COMMUNITY
Start: 2024-05-08 | End: 1900-01-01

## 2024-05-08 RX ORDER — NAPROXEN 500 MG/1
500 TABLET ORAL
Qty: 60 | Refills: 5 | Status: ACTIVE | COMMUNITY
Start: 2024-05-08 | End: 1900-01-01

## 2024-05-08 NOTE — PHYSICAL EXAM
[General Appearance - Well Nourished] : well nourished [General Appearance - Well Developed] : well developed [Auscultation Breath Sounds / Voice Sounds] : lungs were clear to auscultation bilaterally [Heart Rate And Rhythm] : heart rate was normal and rhythm regular [Heart Sounds] : normal S1 and S2 [Murmurs] : no murmurs [Abdomen Soft] : soft [Abdomen Tenderness] : non-tender [Musculoskeletal - Swelling] : no joint swelling seen [] : no rash [Skin Lesions] : no skin lesions [Oriented To Time, Place, And Person] : oriented to person, place, and time

## 2024-05-08 NOTE — CONSULT LETTER
[Dear  ___] : Dear  [unfilled], [Consult Letter:] : I had the pleasure of evaluating your patient, [unfilled]. [Consult Closing:] : Thank you very much for allowing me to participate in the care of this patient.  If you have any questions, please do not hesitate to contact me. [Sincerely,] : Sincerely, [FreeTextEntry3] : Armond Bains MD

## 2024-05-08 NOTE — HISTORY OF PRESENT ILLNESS
[FreeTextEntry1] : 57 y/o M here for consultation  Pt reports pain in medial aspect of elbows, going down elbows. Pt reports several months of this pain.  Worse w activities. No stiffness or swelling.   No fevers, h/a, rashes, hair loss, oral ulcers, epistaxis, sinusitis,  swollen glands, dry mouth, dry eyes, CP, SOB, cough, vision changes, abdominal pain, GERD, n/v/d, blood in stool or urine, focal weakness, sensory loss,  Raynaud's,  weight loss.

## 2024-05-08 NOTE — ASSESSMENT
[FreeTextEntry1] : 59 y/o M w lateral epicondylitis =pain in lateral aspect of elbow =worse w activities =no stiffness, swelling =labs 12/23 w negative ESR, CRP, RF, CCP  Pt clinically presenting like lateral epicondylitis. Offered GC injection but pt declined. Pt wants to try PT and NSAID first.   Patient counseled on NSAIDs, including side effects of GI bleed, rare kidney injury, liver toxicity, cardiac events, increase in bp. Explain if GI issues to try to take with food. If any severe GI issues, bleeding, patient instructed to call office or 911.   Plan naproxen 500mg BID PRN PT referral  RTO PRN

## 2024-05-23 RX ORDER — AMLODIPINE BESYLATE 2.5 MG/1
2.5 TABLET ORAL
Qty: 90 | Refills: 1 | Status: ACTIVE | COMMUNITY
Start: 2022-03-04 | End: 1900-01-01

## 2024-06-17 RX ORDER — FLUTICASONE PROPIONATE 50 UG/1
50 SPRAY, METERED NASAL DAILY
Qty: 1 | Refills: 1 | Status: ACTIVE | COMMUNITY
Start: 2022-03-04 | End: 1900-01-01

## 2024-07-26 ENCOUNTER — APPOINTMENT (OUTPATIENT)
Age: 59
End: 2024-07-26

## 2024-10-03 ENCOUNTER — APPOINTMENT (OUTPATIENT)
Age: 59
End: 2024-10-03
Payer: COMMERCIAL

## 2024-10-03 ENCOUNTER — NON-APPOINTMENT (OUTPATIENT)
Age: 59
End: 2024-10-03

## 2024-10-03 VITALS
BODY MASS INDEX: 24.98 KG/M2 | RESPIRATION RATE: 15 BRPM | WEIGHT: 161 LBS | OXYGEN SATURATION: 97 % | SYSTOLIC BLOOD PRESSURE: 147 MMHG | HEART RATE: 67 BPM | HEIGHT: 67.5 IN | DIASTOLIC BLOOD PRESSURE: 88 MMHG

## 2024-10-03 DIAGNOSIS — Z13.6 ENCOUNTER FOR SCREENING FOR CARDIOVASCULAR DISORDERS: ICD-10-CM

## 2024-10-03 DIAGNOSIS — I10 ESSENTIAL (PRIMARY) HYPERTENSION: ICD-10-CM

## 2024-10-03 DIAGNOSIS — E78.5 HYPERLIPIDEMIA, UNSPECIFIED: ICD-10-CM

## 2024-10-03 PROCEDURE — 93000 ELECTROCARDIOGRAM COMPLETE: CPT

## 2024-10-03 PROCEDURE — 99204 OFFICE O/P NEW MOD 45 MIN: CPT | Mod: 25

## 2024-10-03 NOTE — HISTORY OF PRESENT ILLNESS
[FreeTextEntry1] : RAMA FRANCOIS is a 59 year y.o. M with medical history significant for HTN (dx 2010, HLD, ex-smoker (quit 2010). He is here for cardiac evaluation of chest pain  He reports having on and off chest pains x 2 wks. He describes it as retrosternal chest pain. Non-exertional. Occurs in the morning when he stretches his left arm he feels the chest pain. It is brief. <1 min. Not a.w SOB.  Denies SOB, palpitations, orthopnea, dizziness, syncope, LH, FRANKLIN, edema Patient denies changes in his exercise tolerance during the past 6 months. He can walk 10 blocks and can climb 3 flights of stairs.  Sleeps with 1-2 pillows   He denies history of MI, CVA, DM.  Denies family history of premature ASCVD and /or SCD  No hospitalizations in the past 3 years.  DATA  ECG (10/3/24): NSR at 59 bmp with nl axis and no STT abn  ECG (10/6/23): NSR at 76 bpm w nl axis and no STT abn  LABS (4/2/24): K 4.3; Cre 0.92; LFTs wnl; eGFR 96; Hgb 14; Hct 43.6; Plt 257; CRP 1.1; NENO Neg

## 2024-10-03 NOTE — REVIEW OF SYSTEMS
[Fever] : no fever [Headache] : no headache [Chills] : no chills [Feeling Fatigued] : not feeling fatigued [SOB] : no shortness of breath [Dyspnea on exertion] : not dyspnea during exertion [Chest Discomfort] : no chest discomfort [Lower Ext Edema] : no extremity edema [Leg Claudication] : no intermittent leg claudication [Palpitations] : no palpitations [Orthopnea] : no orthopnea [PND] : no PND [Syncope] : no syncope [Cough] : no cough [Wheezing] : no wheezing [Coughing Up Blood] : no hemoptysis [Dizziness] : no dizziness [Tremor] : no tremor was seen [Convulsions] : no convulsions [Tingling (Paresthesia)] : no tingling [Weakness] : no weakness [Limb Weakness (Paresis)] : no limb weakness (Paresis) [Speech Disturbance] : no speech disturbance [Confusion] : no confusion was observed [Depression] : no depression [Anxiety] : no anxiety [Under Stress] : not under stress [Suicidal] : not suicidal [Easy Bleeding] : no tendency for easy bleeding

## 2024-10-03 NOTE — ASSESSMENT
[FreeTextEntry1] : RAMA FRANCOIS is a 59 year M with past medical history significant for HTN and HLD. He is here for cardiac evaluation. BP is not optimally controlled. He reports atypical chest pain that appears MSK and non-cardiac. ECG is okay. Cardiac exam is normal.   - I reviewed labs  - ECG performed and reviewed today - increase amlodipine 5 mg daily - check lipids - He has multiple cardiac risk factors --> schedule an EST to evaluate for ischemia, functional capacity and risk stratify.  - to check his BP at home and keep a BP log to bring to next visit  - Increase ambulation as tolerated to aim for approximately 30 minutes of moderate activity 5 days a week.  Heart healthy diet low in sodium, sugars and saturated fats and high in fruits, vegetables and whole grains.  Weight loss.   Patient advised to go to the nearest ED whenever any symptoms persist and/or worsens.  Patient/Family/Caregiver verbalized complete understanding of these instructions.  I spent a total of 45 minutes with more than 50% spent on counseling and coordinating care.   RTO after test results are available.

## 2024-10-10 ENCOUNTER — RX RENEWAL (OUTPATIENT)
Age: 59
End: 2024-10-10

## 2024-10-29 ENCOUNTER — APPOINTMENT (OUTPATIENT)
Age: 59
End: 2024-10-29

## 2024-10-29 VITALS
DIASTOLIC BLOOD PRESSURE: 84 MMHG | OXYGEN SATURATION: 98 % | HEIGHT: 67.5 IN | SYSTOLIC BLOOD PRESSURE: 132 MMHG | RESPIRATION RATE: 16 BRPM | WEIGHT: 158 LBS | TEMPERATURE: 98 F | BODY MASS INDEX: 24.51 KG/M2 | HEART RATE: 83 BPM

## 2024-10-29 DIAGNOSIS — Z00.00 ENCOUNTER FOR GENERAL ADULT MEDICAL EXAMINATION W/OUT ABNORMAL FINDINGS: ICD-10-CM

## 2024-10-29 DIAGNOSIS — Z23 ENCOUNTER FOR IMMUNIZATION: ICD-10-CM

## 2024-10-29 PROCEDURE — 99396 PREV VISIT EST AGE 40-64: CPT | Mod: 25

## 2024-10-29 PROCEDURE — G0008: CPT

## 2024-10-29 PROCEDURE — 36415 COLL VENOUS BLD VENIPUNCTURE: CPT

## 2024-10-29 PROCEDURE — 90656 IIV3 VACC NO PRSV 0.5 ML IM: CPT

## 2024-11-01 ENCOUNTER — APPOINTMENT (OUTPATIENT)
Age: 59
End: 2024-11-01

## 2024-11-07 PROBLEM — R73.03 PREDIABETES: Status: ACTIVE | Noted: 2024-11-07

## 2025-01-10 ENCOUNTER — APPOINTMENT (OUTPATIENT)
Age: 60
End: 2025-01-10

## 2025-01-10 VITALS
BODY MASS INDEX: 23.95 KG/M2 | HEART RATE: 85 BPM | TEMPERATURE: 98.9 F | RESPIRATION RATE: 14 BRPM | DIASTOLIC BLOOD PRESSURE: 80 MMHG | HEIGHT: 68 IN | SYSTOLIC BLOOD PRESSURE: 131 MMHG | WEIGHT: 158 LBS | OXYGEN SATURATION: 97 %

## 2025-01-10 DIAGNOSIS — M54.50 LOW BACK PAIN, UNSPECIFIED: ICD-10-CM

## 2025-01-10 PROCEDURE — G2211 COMPLEX E/M VISIT ADD ON: CPT

## 2025-01-10 PROCEDURE — 36415 COLL VENOUS BLD VENIPUNCTURE: CPT

## 2025-01-10 PROCEDURE — 99214 OFFICE O/P EST MOD 30 MIN: CPT

## 2025-01-17 ENCOUNTER — APPOINTMENT (OUTPATIENT)
Age: 60
End: 2025-01-17

## 2025-06-10 ENCOUNTER — RX RENEWAL (OUTPATIENT)
Age: 60
End: 2025-06-10

## 2025-07-07 ENCOUNTER — NON-APPOINTMENT (OUTPATIENT)
Age: 60
End: 2025-07-07

## 2025-07-09 ENCOUNTER — APPOINTMENT (OUTPATIENT)
Age: 60
End: 2025-07-09

## 2025-07-09 VITALS
HEIGHT: 68 IN | BODY MASS INDEX: 23.64 KG/M2 | DIASTOLIC BLOOD PRESSURE: 84 MMHG | OXYGEN SATURATION: 97 % | HEART RATE: 83 BPM | WEIGHT: 156 LBS | RESPIRATION RATE: 14 BRPM | SYSTOLIC BLOOD PRESSURE: 125 MMHG | TEMPERATURE: 98 F

## 2025-07-09 PROBLEM — M25.50 ARTHRALGIA, UNSPECIFIED JOINT: Status: ACTIVE | Noted: 2025-07-09

## 2025-07-09 PROBLEM — R05.9 COUGH: Status: ACTIVE | Noted: 2025-07-09

## 2025-07-09 PROCEDURE — 36415 COLL VENOUS BLD VENIPUNCTURE: CPT

## 2025-07-09 PROCEDURE — G2211 COMPLEX E/M VISIT ADD ON: CPT

## 2025-07-09 PROCEDURE — 99214 OFFICE O/P EST MOD 30 MIN: CPT

## 2025-07-09 RX ORDER — AZITHROMYCIN 250 MG/1
250 TABLET, FILM COATED ORAL
Qty: 1 | Refills: 0 | Status: ACTIVE | COMMUNITY
Start: 2025-07-09 | End: 1900-01-01

## 2025-07-11 RX ORDER — PROMETHAZINE HYDROCHLORIDE 6.25 MG/5ML
6.25 SYRUP ORAL
Qty: 140 | Refills: 0 | Status: ACTIVE | COMMUNITY
Start: 2025-07-11 | End: 1900-01-01

## 2025-07-21 ENCOUNTER — APPOINTMENT (OUTPATIENT)
Age: 60
End: 2025-07-21

## 2025-08-01 ENCOUNTER — TRANSCRIPTION ENCOUNTER (OUTPATIENT)
Age: 60
End: 2025-08-01